# Patient Record
Sex: FEMALE | Race: WHITE | HISPANIC OR LATINO | URBAN - METROPOLITAN AREA
[De-identification: names, ages, dates, MRNs, and addresses within clinical notes are randomized per-mention and may not be internally consistent; named-entity substitution may affect disease eponyms.]

---

## 2023-08-15 ENCOUNTER — APPOINTMENT (RX ONLY)
Dept: URBAN - METROPOLITAN AREA CLINIC 375 | Facility: CLINIC | Age: 33
Setting detail: DERMATOLOGY
End: 2023-08-15

## 2023-08-15 DIAGNOSIS — L60.8 OTHER NAIL DISORDERS: ICD-10-CM

## 2023-08-15 DIAGNOSIS — F42.4 EXCORIATION (SKIN-PICKING) DISORDER: ICD-10-CM

## 2023-08-15 PROCEDURE — ? PRESCRIPTION

## 2023-08-15 PROCEDURE — 99203 OFFICE O/P NEW LOW 30 MIN: CPT

## 2023-08-15 PROCEDURE — ? COUNSELING

## 2023-08-15 PROCEDURE — ? ADDITIONAL NOTES

## 2023-08-15 PROCEDURE — ? PRESCRIPTION MEDICATION MANAGEMENT

## 2023-08-15 PROCEDURE — ? RECOMMENDATIONS

## 2023-08-15 RX ORDER — AMMONIUM LACTATE 12 G/100G
CREAM TOPICAL
Qty: 140 | Refills: 1 | Status: ERX | COMMUNITY
Start: 2023-08-15

## 2023-08-15 RX ADMIN — AMMONIUM LACTATE: 12 CREAM TOPICAL at 00:00

## 2023-08-15 ASSESSMENT — LOCATION DETAILED DESCRIPTION DERM
LOCATION DETAILED: LEFT THUMBNAIL
LOCATION DETAILED: PERIUNGUAL SKIN LEFT THUMB

## 2023-08-15 ASSESSMENT — LOCATION SIMPLE DESCRIPTION DERM
LOCATION SIMPLE: LEFT THUMBNAIL
LOCATION SIMPLE: LEFT THUMB

## 2023-08-15 ASSESSMENT — LOCATION ZONE DERM
LOCATION ZONE: FINGERNAIL
LOCATION ZONE: FINGER

## 2023-08-15 NOTE — PROCEDURE: RECOMMENDATIONS
Render Risk Assessment In Note?: no
Detail Level: Zone
Recommendations (Free Text): Patient to avoid over-exuberant nail grooming, with tools and implements around the nail. Advised gentle grooming with brief soaks, and nail clipper, cutting the nail straight across, with avoidance of digging underneath the nail plate. Also recommend patient use moisturizers daily to condition the nail. Recommend Dermnail/Cutemol via [employer] or other online website.  Patient may return to clinic for further evaluation if she sees no improvement. Patient verbalized understanding.

## 2023-08-15 NOTE — HPI: OTHER
Condition:: Chipped nails
Please Describe Your Condition:: is a new patient who is being seen for a chief complaint of a chipped L thumbnail, present for a year and a half.  Patient reports weekly grooming habits using nail files and implements to clean underneath nail. Patient was seen by telemedicine doctor, who suggested a possible tumor. Patient presents for second opinion and for further evaluation and management.

## 2023-08-15 NOTE — PROCEDURE: PRESCRIPTION MEDICATION MANAGEMENT
Plan: Patient to apply ammonium lactate as directed, in efforts to improve brittleness. Advised, derma nail adjunctively to strengthen the nail. Patient may return to clinic in 2-3 months for follow up.
Render In Strict Bullet Format?: No
Detail Level: Zone
Initiate Treatment: Ammonium lactate 12% cream bid x 30 days, Dermanail/Cutemol adjunctively